# Patient Record
Sex: MALE | Race: WHITE
[De-identification: names, ages, dates, MRNs, and addresses within clinical notes are randomized per-mention and may not be internally consistent; named-entity substitution may affect disease eponyms.]

---

## 2018-05-20 ENCOUNTER — HOSPITAL ENCOUNTER (EMERGENCY)
Dept: HOSPITAL 56 - MW.ED | Age: 28
Discharge: HOME | End: 2018-05-20
Payer: COMMERCIAL

## 2018-05-20 DIAGNOSIS — S05.02XA: Primary | ICD-10-CM
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
## 2018-05-20 DIAGNOSIS — Z23: ICD-10-CM

## 2018-05-20 PROCEDURE — 90471 IMMUNIZATION ADMIN: CPT

## 2018-05-20 PROCEDURE — 99283 EMERGENCY DEPT VISIT LOW MDM: CPT

## 2018-05-20 PROCEDURE — 90715 TDAP VACCINE 7 YRS/> IM: CPT

## 2018-05-20 NOTE — EDM.PDOC
ED HPI GENERAL MEDICAL PROBLEM





- General


Chief Complaint: Eye Problems


Stated Complaint: PT HAS OBJECT IN LT EYE


Time Seen by Provider: 05/20/18 21:37





- History of Present Illness


INITIAL COMMENTS - FREE TEXT/NARRATIVE: 


HISTORY AND PHYSICAL:





History of present illness:


Patient 28-year-old white male presents with concern of irritation in his left 

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





Review of systems: 


As per history of present illness and below otherwise all systems reviewed and 

negative.





Past medical history: 


As per history of present illness and as reviewed below otherwise 

noncontributory.





Surgical history: 


As per history of present illness and as reviewed below otherwise 

noncontributory.





Social history: 


No reported history of drug or alcohol abuse.





Family history: 


As per history of present illness and as reviewed below otherwise 

noncontributory.





Physical exam:


HEENT: Atraumatic, normocephalic, pupils reactive, negative for conjunctival 

pallor or scleral icterus, patient has injected left conjunctiva foreign body 

search including lid eversion was negative corneal staining was remarkable for 

a corneal abrasion is relatively superficial at the 10 o'clock position 

anterior chamber is clear mucous membranes moist, throat clear, neck supple, 

nontender, trachea midline.


Lungs: Clear to auscultation, breath sounds equal bilaterally, chest nontender.


Heart: S1S2, regular, negative for clicks, rubs, or JVD.


Abdomen: Soft, nondistended, nontender. Negative for masses or 

hepatosplenomegaly. Negative for costovertebral tenderness.


Pelvis: Stable nontender.


Genitourinary: Deferred.


Rectal: Deferred.


Extremities: Atraumatic, negative for cords or calf pain. Neurovascular 

unremarkable.


Neuro: Awake, alert, oriented. Cranial nerves II through XII unremarkable. 

Cerebellum unremarkable. Motor and sensory unremarkable throughout. Exam 

nonfocal.





Diagnostics:


Corneal staining





Therapeutics:


Irrigation proparacaine ophthalmic drops erythromycin ophthalmic ointment





Impression: 


#1 corneal abrasion left eye





Definitive disposition and diagnosis as appropriate pending reevaluation and 

review of above.








- Related Data


 Allergies











Allergy/AdvReac Type Severity Reaction Status Date / Time


 


No Known Allergies Allergy   Verified 05/20/18 21:46











Home Meds: 


 Home Meds





. [No Known Home Meds]  05/20/18 [History]











ED ROS GENERAL





- Review of Systems


Review Of Systems: ROS reveals no pertinent complaints other than HPI.





ED EXAM GENERAL W FULL EYE





- Physical Exam


Exam: See Below (See dictation)





Course





- Orders/Labs/Meds


Orders: 





 Active Orders 24 hr











 Category Date Time Status


 


 Proparacaine [Proparacaine 0.5% Ophth Soln] Med  05/20/18 21:45 Active





 2 ml EYELF STAT   








 Medication Orders





Proparacaine HCl (Proparacaine 0.5% Ophth Soln)  2 ml EYELF STAT EMILIO








Meds: 





Medications











Generic Name Dose Route Start Last Admin





  Trade Name Freq  PRN Reason Stop Dose Admin


 


Proparacaine HCl  2 ml  05/20/18 21:45  





  Proparacaine 0.5% Ophth Soln  EYELF   





  STAT EMILIO   





     





     





     





     














Discontinued Medications














Generic Name Dose Route Start Last Admin





  Trade Name Freq  PRN Reason Stop Dose Admin


 


Proparacaine HCl  Confirm  05/20/18 21:45  





  Proparacaine 0.5% Ophth Soln  Administered  05/20/18 21:46  





  Dose   





  15 ml   





  .ROUTE   





  .STK-MED ONE   





     





     





     





     














Departure





- Departure


Time of Disposition: 21:53


Disposition: Home, Self-Care 01


Condition: Good


Clinical Impression: 


 Corneal abrasion








- Discharge Information


Additional Instructions: 


The following information is given to patients seen in the emergency department 

who are being discharged to home. This information is to outline your options 

for follow-up care. We provide all patients seen in our emergency department 

with a follow-up referral.





The need for follow-up, as well as the timing and circumstances, are variable 

depending upon the specifics of your emergency department visit.





If you don't have a primary care physician on staff, we will provide you with a 

referral. We always advise you to contact your personal physician following an 

emergency department visit to inform them of the circumstance of the visit and 

for follow-up with them and/or the need for any referrals to a consulting 

specialist.





The emergency department will also refer you to a specialist when appropriate. 

This referral assures that you have the opportunity for followup care with a 

specialist. All of these measure are taken in an effort to provide you with 

optimal care, which includes your followup.





Under all circumstances we always encourage you to contact your private 

physician who remains a resource for coordinating  your care. When calling for 

followup care, please make the office aware that this follow-up is from your 

recent emergency room visit. If for any reason you are refused follow-up, 

please contact the [facility] emergency department at [phone] 

and asked to speak to the emergency department charge nurse.








[facility]


[facility] [address] [address]


Phone: [phone]


Fax: [phone]








Erythromycin ophthalmic ointment follow-up primary medical doctor/ophthalmology 

as needed as discussed return as needed as discussed





- My Orders


Last 24 Hours: 





My Active Orders





05/20/18 21:45


Proparacaine [Proparacaine 0.5% Ophth Soln]   2 ml EYELF STAT 














- Assessment/Plan


Last 24 Hours: 





My Active Orders





05/20/18 21:45


Proparacaine [Proparacaine 0.5% Ophth Soln]   2 ml EYELF STAT